# Patient Record
Sex: FEMALE | Race: OTHER | HISPANIC OR LATINO | URBAN - METROPOLITAN AREA
[De-identification: names, ages, dates, MRNs, and addresses within clinical notes are randomized per-mention and may not be internally consistent; named-entity substitution may affect disease eponyms.]

---

## 2023-12-14 ENCOUNTER — OFFICE (OUTPATIENT)
Dept: URBAN - METROPOLITAN AREA CLINIC 84 | Facility: CLINIC | Age: 68
End: 2023-12-14

## 2023-12-14 VITALS
HEIGHT: 60 IN | SYSTOLIC BLOOD PRESSURE: 100 MMHG | DIASTOLIC BLOOD PRESSURE: 62 MMHG | WEIGHT: 144 LBS | HEART RATE: 75 BPM | OXYGEN SATURATION: 99 %

## 2023-12-14 DIAGNOSIS — R19.5 OTHER FECAL ABNORMALITIES: ICD-10-CM

## 2023-12-14 DIAGNOSIS — K76.0 FATTY (CHANGE OF) LIVER, NOT ELSEWHERE CLASSIFIED: ICD-10-CM

## 2023-12-14 PROCEDURE — 99204 OFFICE O/P NEW MOD 45 MIN: CPT | Performed by: NURSE PRACTITIONER

## 2023-12-14 NOTE — SERVICEHPINOTES
Warren 68-year-old referred for positive Cologard.  warren 68-year-old returns for follow-up.  She does not speak English so this is completed through  service.  She states she is doing fine but her bowel movements are normal.  She states she has a bowel movement every day.  Occasionally she will have a hard stool.  She denies rectal bleeding, abdominal pain, weight loss, nausea vomiting.br
br   Labs
brpositive Cologard
br 7/2023–WBC 7, hemoglobin 13, platelet 296, CMP normal except 
br
br Imaging
br 11/2019–fibroscan–327/4.8
br 9/2019–abdominal ultrasound–fatty liverbr
br Procedures
br 9/2019–EGD–Dr. Silva–gastritis negative H. pylori

## 2024-01-09 ENCOUNTER — OFFICE (OUTPATIENT)
Dept: URBAN - METROPOLITAN AREA PATHOLOGY 10 | Facility: PATHOLOGY | Age: 69
End: 2024-01-09

## 2024-01-09 ENCOUNTER — AMBULATORY SURGICAL CENTER (OUTPATIENT)
Dept: URBAN - METROPOLITAN AREA SURGERY 19 | Facility: SURGERY | Age: 69
End: 2024-01-09

## 2024-01-09 DIAGNOSIS — D12.3 BENIGN NEOPLASM OF TRANSVERSE COLON: ICD-10-CM

## 2024-01-09 DIAGNOSIS — Z12.11 ENCOUNTER FOR SCREENING FOR MALIGNANT NEOPLASM OF COLON: ICD-10-CM

## 2024-01-09 DIAGNOSIS — K63.5 POLYP OF COLON: ICD-10-CM

## 2024-01-09 DIAGNOSIS — R19.5 OTHER FECAL ABNORMALITIES: ICD-10-CM

## 2024-01-09 LAB
COLONOSCOPY STUDY: (no result)
COLONOSCOPY STUDY: (no result)

## 2024-01-09 PROCEDURE — 45380 COLONOSCOPY AND BIOPSY: CPT | Mod: 59,KX,PT | Performed by: INTERNAL MEDICINE

## 2024-01-09 PROCEDURE — 45385 COLONOSCOPY W/LESION REMOVAL: CPT | Mod: KX,PT | Performed by: INTERNAL MEDICINE

## 2024-01-09 PROCEDURE — 88305 TISSUE EXAM BY PATHOLOGIST: CPT | Mod: TC | Performed by: STUDENT IN AN ORGANIZED HEALTH CARE EDUCATION/TRAINING PROGRAM

## 2024-01-09 PROCEDURE — 45380 COLONOSCOPY AND BIOPSY: CPT | Mod: KX,59,PT | Performed by: INTERNAL MEDICINE
